# Patient Record
Sex: MALE | Race: WHITE | URBAN - METROPOLITAN AREA
[De-identification: names, ages, dates, MRNs, and addresses within clinical notes are randomized per-mention and may not be internally consistent; named-entity substitution may affect disease eponyms.]

---

## 2024-01-05 ENCOUNTER — OFFICE VISIT (OUTPATIENT)
Dept: URBAN - METROPOLITAN AREA CLINIC 19 | Facility: CLINIC | Age: 39
End: 2024-01-05
Payer: OTHER GOVERNMENT

## 2024-01-05 ENCOUNTER — DASHBOARD ENCOUNTERS (OUTPATIENT)
Age: 39
End: 2024-01-05

## 2024-01-05 ENCOUNTER — LAB OUTSIDE AN ENCOUNTER (OUTPATIENT)
Dept: URBAN - METROPOLITAN AREA CLINIC 19 | Facility: CLINIC | Age: 39
End: 2024-01-05

## 2024-01-05 VITALS
OXYGEN SATURATION: 96 % | DIASTOLIC BLOOD PRESSURE: 78 MMHG | TEMPERATURE: 98.4 F | HEIGHT: 75 IN | BODY MASS INDEX: 30.34 KG/M2 | HEART RATE: 69 BPM | SYSTOLIC BLOOD PRESSURE: 116 MMHG | WEIGHT: 244 LBS

## 2024-01-05 DIAGNOSIS — R19.7 ACUTE DIARRHEA: ICD-10-CM

## 2024-01-05 DIAGNOSIS — R10.84 ABDOMINAL CRAMPING, GENERALIZED: ICD-10-CM

## 2024-01-05 PROCEDURE — 99204 OFFICE O/P NEW MOD 45 MIN: CPT | Performed by: NURSE PRACTITIONER

## 2024-01-05 PROCEDURE — 99244 OFF/OP CNSLTJ NEW/EST MOD 40: CPT | Performed by: NURSE PRACTITIONER

## 2024-01-05 RX ORDER — SILDENAFIL 100 MG/1
1 TABLET AS NEEDED TABLET, FILM COATED ORAL ONCE A DAY
Status: ON HOLD | COMMUNITY

## 2024-01-05 RX ORDER — HYALURONATE SODIUM 20 MG/2 ML
2 ML SYRINGE (ML) INTRAARTICULAR
Status: ON HOLD | COMMUNITY

## 2024-01-05 RX ORDER — FLUTICASONE PROPIONATE 50 UG/1
1 SPRAY IN EACH NOSTRIL SPRAY, METERED NASAL ONCE A DAY
Status: ON HOLD | COMMUNITY

## 2024-01-05 NOTE — HPI-TODAY'S VISIT:
38-year-old male presents today for chronic IBS diarrhea.  Sent upon referral from rBock Cruz PA-C.  A copy of this report will be sent to the referring provider.  He reports chronic diarrhea his entire life. Was told he may have IBS but has never been worked up for it.  He reports urgency and chronic diarrhea. Can have a BM 2-3 times or even up to 7 times/day. Feels urgency to have a BM, but often times nothing comes out, has lower abdominal pressure and gas. Often times only gas comes out. Consistency ranges soft play-lawrence to splatter diarrhea. Now to the point family members urge him to get checked out.  Reports bright red blood in toilet occasionally, without pain and sporadic - ongoing for many years lasting few days and then resolves.  No family hx of IBD or GI or colon cancer.  He reports random skin rashes thought most likely eczema has seen dermatology, random occasional mouth sores. Positive for significant joint pains but was in the [employer] so feels his pain come from this.

## 2024-01-11 LAB
A/G RATIO: 2
ALBUMIN: 4.4
ALKALINE PHOSPHATASE: 38
ALT (SGPT): 98
AST (SGOT): 124
BILIRUBIN, TOTAL: 0.7
BUN/CREATININE RATIO: (no result)
BUN: 21
C-REACTIVE PROTEIN, QUANT: 3
CALCIUM: 9.1
CALPROTECTIN, FECAL: 87
CAMPYLOBACTER SPP. AG,EIA: (no result)
CARBON DIOXIDE, TOTAL: 28
CHLORIDE: 108
CLOSTRIDIUM DIFFICILE: (no result)
CREATININE: 0.9
EGFR: 111
GIARDIA AG, EIA, STOOL: (no result)
GLOBULIN, TOTAL: 2.2
GLUCOSE: 105
HEMATOCRIT: 44.7
HEMOGLOBIN: 15
IMMUNOGLOBULIN A, QN, SERUM: 281
INTERPRETATION: (no result)
LIPASE: 19
MCH: 31.4
MCHC: 33.6
MCV: 93.7
MPV: 11.1
PANCREATIC ELASTASE, FECAL: >500
PLATELET COUNT: 220
POTASSIUM: 4.4
PROTEIN, TOTAL: 6.6
RDW: 12.5
RED BLOOD CELL COUNT: 4.77
SALMONELLA AND SHIGELLA, CULTURE: (no result)
SHIGA TOXINS, EIA W/RFL TO E.COLI O157 CULTURE: (no result)
SODIUM: 142
T-TRANSGLUTAMINASE (TTG) IGA: 118.5
WHITE BLOOD CELL COUNT: 7

## 2024-01-12 ENCOUNTER — TELEPHONE ENCOUNTER (OUTPATIENT)
Dept: URBAN - METROPOLITAN AREA CLINIC 19 | Facility: CLINIC | Age: 39
End: 2024-01-12

## 2024-02-01 ENCOUNTER — COLON (OUTPATIENT)
Dept: URBAN - METROPOLITAN AREA SURGERY CENTER 31 | Facility: SURGERY CENTER | Age: 39
End: 2024-02-01

## 2024-02-26 ENCOUNTER — OV NP (OUTPATIENT)
Dept: URBAN - METROPOLITAN AREA CLINIC 19 | Facility: CLINIC | Age: 39
End: 2024-02-26